# Patient Record
Sex: MALE | Race: BLACK OR AFRICAN AMERICAN | NOT HISPANIC OR LATINO | Employment: OTHER | URBAN - METROPOLITAN AREA
[De-identification: names, ages, dates, MRNs, and addresses within clinical notes are randomized per-mention and may not be internally consistent; named-entity substitution may affect disease eponyms.]

---

## 2017-03-20 ENCOUNTER — TELEPHONE (OUTPATIENT)
Dept: SMOKING CESSATION | Facility: CLINIC | Age: 62
End: 2017-03-20

## 2017-03-20 NOTE — TELEPHONE ENCOUNTER
Patient was a No Show for intake appointment.  Called  Mobile number on file and was unable to speak with patient or leave a message, phone continues to ring

## 2019-08-21 ENCOUNTER — TELEPHONE (OUTPATIENT)
Dept: PAIN MEDICINE | Facility: CLINIC | Age: 64
End: 2019-08-21

## 2019-08-21 NOTE — TELEPHONE ENCOUNTER
Contacted and spoke to Mr. Robertson, he was informed that the [facility] pain providers are not accepting any new Medicaid patients at this time. He may check back to see if that changes.

## 2019-08-21 NOTE — TELEPHONE ENCOUNTER
----- Message from Abigail Morris sent at 8/21/2019  9:20 AM CDT -----  Contact: JUNIOR PÉREZ [[id number]]    Name of Who is Calling: JUNIOR PÉREZ [[id number]]       What is the request in detail: Patient is requesting a call from staff in regards to scheduling a new patient visit with pain management .....Please contact to further discuss and advise.     Can the clinic reply by [facility]: No     What Number to Call Back if not in [facility]:  [phone]